# Patient Record
Sex: FEMALE | Race: WHITE | NOT HISPANIC OR LATINO | Employment: OTHER | URBAN - METROPOLITAN AREA
[De-identification: names, ages, dates, MRNs, and addresses within clinical notes are randomized per-mention and may not be internally consistent; named-entity substitution may affect disease eponyms.]

---

## 2020-05-28 NOTE — PATIENT DISCUSSION
"""S/P IOL OD: Sensar AAB00 23.0 (Target: [place]) +Omidria. Continue post operative instructions and drops per schedule.  """

## 2020-06-01 NOTE — PATIENT DISCUSSION
"""S/P IOL OD: Sensar AAB00 23.0 (Target: [place]) +Omidria. Continue post operative instructions and drops per schedule.  """

## 2020-06-18 NOTE — PATIENT DISCUSSION
"""S/P IOL OS: Sensar AAB00 23.0 (Target: [place]) +Omidria. Continue post operative instructions and drops per schedule.  """

## 2020-06-24 NOTE — PATIENT DISCUSSION
"""S/P IOL OS: Sensar AAB00 23.0 (Target: [place]) +Omidria.  Continue post operative instructions ""

## 2020-07-22 NOTE — PATIENT DISCUSSION
"""S/P IOL OS: Sensar AAB00 23.0 (Target: [place]) +Omidria. Continue post operative instructions and drops per schedule.  """

## 2022-06-20 RX ORDER — ETONOGESTREL AND ETHINYL ESTRADIOL 11.7; 2.7 MG/1; MG/1
INSERT, EXTENDED RELEASE VAGINAL
COMMUNITY

## 2022-06-20 RX ORDER — DEXTROAMPHETAMINE SACCHARATE, AMPHETAMINE ASPARTATE, DEXTROAMPHETAMINE SULFATE AND AMPHETAMINE SULFATE 7.5; 7.5; 7.5; 7.5 MG/1; MG/1; MG/1; MG/1
TABLET ORAL
COMMUNITY

## 2022-06-20 RX ORDER — CLONAZEPAM 0.5 MG/1
TABLET ORAL
COMMUNITY

## 2023-04-28 ENCOUNTER — NEW PATIENT (OUTPATIENT)
Dept: URBAN - METROPOLITAN AREA CLINIC 10 | Facility: CLINIC | Age: 33
End: 2023-04-28

## 2023-04-28 DIAGNOSIS — H10.11: ICD-10-CM

## 2023-04-28 DIAGNOSIS — H52.13: ICD-10-CM

## 2023-04-28 DIAGNOSIS — H47.233: ICD-10-CM

## 2023-04-28 PROCEDURE — 92004 COMPRE OPH EXAM NEW PT 1/>: CPT

## 2023-04-28 PROCEDURE — 92310E CONTACT LENS 100

## 2023-04-28 PROCEDURE — 92250 FUNDUS PHOTOGRAPHY W/I&R: CPT

## 2023-04-28 PROCEDURE — 92015 DETERMINE REFRACTIVE STATE: CPT

## 2023-04-28 ASSESSMENT — VISUAL ACUITY
OU_CC: 20/20
OD_CC: 20/20
OS_CC: 20/20

## 2023-04-28 ASSESSMENT — KERATOMETRY
OS_K1POWER_DIOPTERS: 43.00
OD_K2POWER_DIOPTERS: 43.75
OD_AXISANGLE_DEGREES: 175
OS_K2POWER_DIOPTERS: 43.50
OS_AXISANGLE_DEGREES: 15
OS_AXISANGLE2_DEGREES: 105
OD_AXISANGLE2_DEGREES: 85
OD_K1POWER_DIOPTERS: 43.25

## 2023-04-28 ASSESSMENT — TONOMETRY
OS_IOP_MMHG: 16
OD_IOP_MMHG: 16